# Patient Record
Sex: MALE | Race: WHITE | NOT HISPANIC OR LATINO | URBAN - METROPOLITAN AREA
[De-identification: names, ages, dates, MRNs, and addresses within clinical notes are randomized per-mention and may not be internally consistent; named-entity substitution may affect disease eponyms.]

---

## 2018-06-20 ENCOUNTER — EMERGENCY (EMERGENCY)
Facility: HOSPITAL | Age: 78
LOS: 0 days | Discharge: HOME | End: 2018-06-21
Attending: EMERGENCY MEDICINE
Payer: MEDICARE

## 2018-06-20 VITALS
RESPIRATION RATE: 20 BRPM | HEART RATE: 65 BPM | OXYGEN SATURATION: 96 % | TEMPERATURE: 96 F | DIASTOLIC BLOOD PRESSURE: 60 MMHG | SYSTOLIC BLOOD PRESSURE: 120 MMHG

## 2018-06-20 DIAGNOSIS — H53.129 TRANSIENT VISUAL LOSS, UNSPECIFIED EYE: ICD-10-CM

## 2018-06-20 DIAGNOSIS — G45.9 TRANSIENT CEREBRAL ISCHEMIC ATTACK, UNSPECIFIED: ICD-10-CM

## 2018-06-20 DIAGNOSIS — E78.5 HYPERLIPIDEMIA, UNSPECIFIED: ICD-10-CM

## 2018-06-20 DIAGNOSIS — E11.9 TYPE 2 DIABETES MELLITUS WITHOUT COMPLICATIONS: ICD-10-CM

## 2018-06-20 DIAGNOSIS — I10 ESSENTIAL (PRIMARY) HYPERTENSION: ICD-10-CM

## 2018-06-20 LAB
ALBUMIN SERPL ELPH-MCNC: 4.5 G/DL — SIGNIFICANT CHANGE UP (ref 3.5–5.2)
ALP SERPL-CCNC: 41 U/L — SIGNIFICANT CHANGE UP (ref 30–115)
ALT FLD-CCNC: 18 U/L — SIGNIFICANT CHANGE UP (ref 0–41)
ANION GAP SERPL CALC-SCNC: 13 MMOL/L — SIGNIFICANT CHANGE UP (ref 7–14)
AST SERPL-CCNC: 19 U/L — SIGNIFICANT CHANGE UP (ref 0–41)
BASOPHILS # BLD AUTO: 0.03 K/UL — SIGNIFICANT CHANGE UP (ref 0–0.2)
BASOPHILS NFR BLD AUTO: 0.4 % — SIGNIFICANT CHANGE UP (ref 0–1)
BILIRUB SERPL-MCNC: 0.3 MG/DL — SIGNIFICANT CHANGE UP (ref 0.2–1.2)
BUN SERPL-MCNC: 21 MG/DL — HIGH (ref 10–20)
CALCIUM SERPL-MCNC: 9.2 MG/DL — SIGNIFICANT CHANGE UP (ref 8.5–10.1)
CHLORIDE SERPL-SCNC: 101 MMOL/L — SIGNIFICANT CHANGE UP (ref 98–110)
CHOLEST SERPL-MCNC: 151 MG/DL — SIGNIFICANT CHANGE UP (ref 100–200)
CK MB CFR SERPL CALC: 3.3 NG/ML — SIGNIFICANT CHANGE UP (ref 0.6–6.3)
CK SERPL-CCNC: 97 U/L — SIGNIFICANT CHANGE UP (ref 0–225)
CO2 SERPL-SCNC: 27 MMOL/L — SIGNIFICANT CHANGE UP (ref 17–32)
CREAT SERPL-MCNC: 1.2 MG/DL — SIGNIFICANT CHANGE UP (ref 0.7–1.5)
EOSINOPHIL # BLD AUTO: 0.11 K/UL — SIGNIFICANT CHANGE UP (ref 0–0.7)
EOSINOPHIL NFR BLD AUTO: 1.4 % — SIGNIFICANT CHANGE UP (ref 0–8)
GLUCOSE SERPL-MCNC: 79 MG/DL — SIGNIFICANT CHANGE UP (ref 70–99)
HCT VFR BLD CALC: 42.4 % — SIGNIFICANT CHANGE UP (ref 42–52)
HDLC SERPL-MCNC: 43 MG/DL — SIGNIFICANT CHANGE UP (ref 40–125)
HGB BLD-MCNC: 13.4 G/DL — LOW (ref 14–18)
IMM GRANULOCYTES NFR BLD AUTO: 0.3 % — SIGNIFICANT CHANGE UP (ref 0.1–0.3)
LIPID PNL WITH DIRECT LDL SERPL: 85 MG/DL — SIGNIFICANT CHANGE UP (ref 4–129)
LYMPHOCYTES # BLD AUTO: 1.66 K/UL — SIGNIFICANT CHANGE UP (ref 1.2–3.4)
LYMPHOCYTES # BLD AUTO: 21.1 % — SIGNIFICANT CHANGE UP (ref 20.5–51.1)
MCHC RBC-ENTMCNC: 25.6 PG — LOW (ref 27–31)
MCHC RBC-ENTMCNC: 31.6 G/DL — LOW (ref 32–37)
MCV RBC AUTO: 80.9 FL — SIGNIFICANT CHANGE UP (ref 80–94)
MONOCYTES # BLD AUTO: 0.59 K/UL — SIGNIFICANT CHANGE UP (ref 0.1–0.6)
MONOCYTES NFR BLD AUTO: 7.5 % — SIGNIFICANT CHANGE UP (ref 1.7–9.3)
NEUTROPHILS # BLD AUTO: 5.44 K/UL — SIGNIFICANT CHANGE UP (ref 1.4–6.5)
NEUTROPHILS NFR BLD AUTO: 69.3 % — SIGNIFICANT CHANGE UP (ref 42.2–75.2)
NRBC # BLD: 0 /100 WBCS — SIGNIFICANT CHANGE UP (ref 0–0)
PLATELET # BLD AUTO: 202 K/UL — SIGNIFICANT CHANGE UP (ref 130–400)
POTASSIUM SERPL-MCNC: 4 MMOL/L — SIGNIFICANT CHANGE UP (ref 3.5–5)
POTASSIUM SERPL-SCNC: 4 MMOL/L — SIGNIFICANT CHANGE UP (ref 3.5–5)
PROT SERPL-MCNC: 7 G/DL — SIGNIFICANT CHANGE UP (ref 6–8)
RBC # BLD: 5.24 M/UL — SIGNIFICANT CHANGE UP (ref 4.7–6.1)
RBC # FLD: 16.3 % — HIGH (ref 11.5–14.5)
SODIUM SERPL-SCNC: 141 MMOL/L — SIGNIFICANT CHANGE UP (ref 135–146)
TOTAL CHOLESTEROL/HDL RATIO MEASUREMENT: 3.5 RATIO — LOW (ref 4–5.5)
TRIGL SERPL-MCNC: 161 MG/DL — HIGH (ref 10–149)
TROPONIN T SERPL-MCNC: <0.01 NG/ML — SIGNIFICANT CHANGE UP
WBC # BLD: 7.85 K/UL — SIGNIFICANT CHANGE UP (ref 4.8–10.8)
WBC # FLD AUTO: 7.85 K/UL — SIGNIFICANT CHANGE UP (ref 4.8–10.8)

## 2018-06-20 PROCEDURE — 93880 EXTRACRANIAL BILAT STUDY: CPT | Mod: 26

## 2018-06-20 NOTE — ED CDU PROVIDER INITIAL DAY NOTE - OBJECTIVE STATEMENT
79 y/o male with PMHX of HTN, HLD, DM, Diabetic Neuropathy, BPH, Left Eye artifical lens. presenting under TIA protocol. As per pt, this morning around 9am, pt states he went to go read the newspaper and started having blurry vision in his right eye. Pt states he went to clean his glasses and his eyes but blurry vision continued. States his vision went completely blind in the right eye lasting approx 5- 6 mins, then vision returned. Pt states he only saw light, while he was blind. Denies slurred speech, facial drooping, weakness, chest pain, syncope, dizziness/lightheadedness, numbness/tingling, Denies previous history of similar symptoms

## 2018-06-20 NOTE — ED PROVIDER NOTE - OBJECTIVE STATEMENT
77 y/o M, PMHx DM, BPH, Dyslipidemia, Neuropathy and s/p right lens implant, presents to the ED with complaints of transient vision loss to his right eye this AM at approximately 0930. He states that symptoms lasted less than a minute and have since resolved. He went to his opthalmologist Dr. Norris who did not believe etiology to be opthalmologic in nature and advised that he come to the ED for further work-up. He denies associated cephalgia, nausea, vomiting, fever, chills, neck pain, chest pain, and dyspnea.

## 2018-06-20 NOTE — ED CDU PROVIDER INITIAL DAY NOTE - ATTENDING CONTRIBUTION TO CARE
Pt has a history of HTN, DM, elevated cholesterol and presents with right eye vision loss lasting 6 minutes while reading a news paper. Pt notes he was able to see light. 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 No other complaints.     PE: neuro intact. S1S2 rrr, lungs clear, abdomen is soft nontender, normal speech.     IMP: TIA  rec: ehco, carotid duplex, MRI , neuro, cardiac monitoring

## 2018-06-20 NOTE — ED PROVIDER NOTE - PROGRESS NOTE DETAILS
case dw Dr. Norris who saw patient earlier today, normal ophtho exam, sent for neuro workup. will continue to assess. Per preliminary vascular report: carotid artery stenosis 20-39% b/l Spoke with neurologist Dr. Leonard ; we may place in observation unit for MRI/MRA ; spoke with observation unit GIAN Meraz. Patient and family amenable to plan.

## 2018-06-20 NOTE — ED PROVIDER NOTE - ATTENDING CONTRIBUTION TO CARE
78y m hx HLD, DM, afib (on metoprolol), L eye blindness, R eye artificial lens presents w eye vision change. This AM, patient had episode of blurry vision followed by flashing lights to R eye, lasted 5-6 minutes and now resolved. Associated w mild HA. No hx head trauma. No neck pain. No n/v. No fever or chills. No CP, SOB. No dizziness or syncope. Patient presented to ophtho Dr. Norris who sent patient to ED for evaluation. Patient has not had similar in the past. Exam: WDWN NAD comfortable appearing and conversing appropriately. NCAT neck FROM no ttp and no meningismus. Skin warm and dry. HEENT + L eye complete opacification, R eye w dilated pupil, minimally reactive. EOMI. VA 20/70 OD. MMM. S1S2 RRR, equal pulses b/l, lungs CTAB, no w/r/r, abdomen soft NTND, no r/g, no CVAT, no suprapubic ttp. No LE edema. A&O, normal strength and sensation, a&Ox3, other than above CN ii-xii intact, no dysmetria, no pronator drift, 5/5 strength UE and LE b/l, normal sensation, no aphasia, no dysarthria, no droop, normal gait. A/P - labs, imaging, ophtho/neuro consults as needed, likely admission.

## 2018-06-20 NOTE — ED PROVIDER NOTE - MEDICAL DECISION MAKING DETAILS
patient feeling well, no continued sx. dw dr. Leonard who will assess patient. ALIYAH for neuroimaging. case dw patient and family who are agreeable to plan.

## 2018-06-20 NOTE — ED ADULT NURSE NOTE - PMH
Diabetes    High cholesterol    Hypertension Diabetes    High cholesterol    Hypertension    Neuropathy

## 2018-06-21 VITALS
HEART RATE: 60 BPM | RESPIRATION RATE: 20 BRPM | SYSTOLIC BLOOD PRESSURE: 143 MMHG | OXYGEN SATURATION: 98 % | TEMPERATURE: 98 F | DIASTOLIC BLOOD PRESSURE: 62 MMHG

## 2018-06-21 RX ORDER — CLOPIDOGREL BISULFATE 75 MG/1
1 TABLET, FILM COATED ORAL
Qty: 14 | Refills: 0 | OUTPATIENT
Start: 2018-06-21 | End: 2018-07-04

## 2018-06-21 NOTE — CONSULT NOTE ADULT - ASSESSMENT
This is a 78y Male with h/o DM (controlled), HLD, HTN, and atrial fibrillation, presents complaining of transient unilateral vision loss.    Plan:   -Holter-monitoring outpatient with cardiologist, 30 days recommended to evaluate for AFib  -Discharge on Plavix and ASA  -Consider evaluating outpatient for possible right sensorineural hearing loss      06-21-18 @ 11:59 This is a 78y Male with h/o DM (controlled), HLD, HTN, and atrial fibrillation, presents complaining of transient unilateral vision loss s/o amaurosis fugax with underlying vascular risk factors.  neurologic w/u at this time negative.  recommend event monitor as outpt to clarify question of paroxysmal AFib.  in meantime continue secondary stroke prevention.    Plan:   -Holter-monitoring outpatient with cardiologist, 30 days recommended to evaluate for AFib  -Discharge on Plavix 75 and continue simastatin  - f/u as outpt in 2 - 4 wks      06-21-18 @ 11:59

## 2018-06-21 NOTE — CONSULT NOTE ADULT - SUBJECTIVE AND OBJECTIVE BOX
Neurology Consult    Patient is a 78y old  Male who presents with a chief complaint of transient unilateral vision loss    HPI:  Patient is an 77 yo  male with PMH of DM (controlled), HLD, HTN, and atrial fibrillation, presents complaining of transient unilateral vision loss. Yesterday morning he had a loss of vision in his right eye for about 6 minutes, described it as darkness, never had an episode like this before, vision gradually came back 10 minutes after. He had a mild headache afterwards.  He had atrial fibrillation about 5 years ago while taking pseudoephedrine, he was then placed on ASA 81 mg and Plavix, however 2 years ago he discontinued taking Plavix because someone told him not to take it.  He denied slurred speech, weakness, numbness or tingling, tearing of the eye, nausea or vomiting, chills or fever.  No history of stroke, MI, migraines, or seizures.      PAST MEDICAL & SURGICAL HISTORY:  Atrial Fibrillation  Neuropathy  High cholesterol  Hypertension  Diabetes  Essential tremor      FAMILY HISTORY:  Unknown, adopted    Social History: (-) x 3    Allergies    No Known Allergies    Intolerances        MEDICATIONS  (STANDING):    MEDICATIONS  (PRN):      Review of systems:    Constitutional: No fever, weight loss or fatigue    Eyes: No eye pain or discharge  ENMT:  Harper test lateralized to the left. Rinne positive bilaterally.  Neck: No pain or stiffness  Respiratory: No cough, wheezing, chills or hemoptysis  Cardiovascular: No chest pain, palpitations, shortness of breath, dyspnea on exertion  Gastrointestinal: No abdominal pain, nausea, vomiting or hematemesis; No diarrhea or constipation.   Genitourinary: No dysuria, frequency, hematuria or incontinence  Neurological: As per HPI  Skin: No rashes or lesions   Endocrine: No heat or cold intolerance; No hair loss  Musculoskeletal: No joint pain or swelling  Psychiatric: No depression, anxiety, mood swings  Heme/Lymph: No easy bruising or bleeding gums    Vital Signs Last 24 Hrs  T(C): 36.8 (21 Jun 2018 07:33), Max: 36.8 (21 Jun 2018 07:33)  T(F): 98.2 (21 Jun 2018 07:33), Max: 98.2 (21 Jun 2018 07:33)  HR: 60 (21 Jun 2018 07:33) (55 - 65)  BP: 143/62 (21 Jun 2018 07:33) (116/55 - 143/62)  BP(mean): --  RR: 20 (21 Jun 2018 07:33) (20 - 20)  SpO2: 98% (21 Jun 2018 07:33) (96% - 98%)    Neurologic Examination:  General:  Appearance is consistent with chronologic age.  No abnormal facies.   General: The patient is oriented to person, place, time and date.  Recent and remote memory intact.  Fund of knowledge is intact and normal.  Language with normal repetition, comprehension and naming.  Nondysarthric.    Cranial nerves: Left eye is nonfunctional after an accident in the war many years ago. Right eye: EOMI w/o nystagmus, skew or reported double vision.  Pupil is round and reactive to light.  No ptosis/weakness of eyelid closure. Sensory symmetric and intact V1-V3.  No facial asymmetry.  Harper lateralized to the left. Rinne positive bilaterally.  Palate elevates midline.  Tongue midline.  Motor examination:   Normal tone, bulk and range of motion.  No tenderness, twitching, or involuntary movements. Essential tremor present.  Formal Muscle Strength Testing: (MRC grade R/L) 5/5 UE; 5/5 LE.  No observable drift.  Reflexes:   2+ b/l biceps, brachioradialis, 3+b/l patella.   Sensory examination:   Intact to light touch and temperature in all extremities.  Cerebellum:   FTN/HKS intact.  No dysmetria or dysdiadokinesia.  Gait narrow based and normal. Tandem gait intact.    Labs:   CBC Full  -  ( 20 Jun 2018 14:04 )  WBC Count : 7.85 K/uL  Hemoglobin : 13.4 g/dL  Hematocrit : 42.4 %  Platelet Count - Automated : 202 K/uL  Mean Cell Volume : 80.9 fL  Mean Cell Hemoglobin : 25.6 pg  Mean Cell Hemoglobin Concentration : 31.6 g/dL  Auto Neutrophil # : 5.44 K/uL  Auto Lymphocyte # : 1.66 K/uL  Auto Monocyte # : 0.59 K/uL  Auto Eosinophil # : 0.11 K/uL  Auto Basophil # : 0.03 K/uL  Auto Neutrophil % : 69.3 %  Auto Lymphocyte % : 21.1 %  Auto Monocyte % : 7.5 %  Auto Eosinophil % : 1.4 %  Auto Basophil % : 0.4 %    06-20    141  |  101  |  21<H>  ----------------------------<  79  4.0   |  27  |  1.2    Ca    9.2      20 Jun 2018 14:04    TPro  7.0  /  Alb  4.5  /  TBili  0.3  /  DBili  x   /  AST  19  /  ALT  18  /  AlkPhos  41  06-20    LIVER FUNCTIONS - ( 20 Jun 2018 14:04 )  Alb: 4.5 g/dL / Pro: 7.0 g/dL / ALK PHOS: 41 U/L / ALT: 18 U/L / AST: 19 U/L / GGT: x                   Neuroimaging:  NCHCT: CT Head No Cont:   EXAM:  CT BRAIN            PROCEDURE DATE:  06/20/2018            INTERPRETATION:  Clinical History / Reason for exam: TIA.    Technique: Multiple contiguous axial CT images of the head were obtained   from the base of the skull to the vertex without administration of   intravenous contrast.    Comparison: None available at this time.       Findings: The ventricles, basal cisterns and sulcal pattern are slightly   prominent consistent with parenchymal volume loss. There are scattered   punctatefoci of hypodensities in the bilateral frontal and parietal   periventricular and subcortical white matter which are nonspecific and   without mass effect most likely consistent with chronic small vessel   ischemic changes in a patient of this statedage. There is no acute mass   effect, midline shift or hemorrhage. No extra-axial fluid collections are   identified.    The bones of the calvarium are intact. The left globe is slightly   shrunken and demonstrates calcifications may be consistent with previous   trauma and/or phthisis bulbi The paranasal sinuses, bilateral mastoid   complexes and right globe and orbit are grossly unremarkable.    Beam hardening artifact is noted overlying the brain stem and posterior   fossa which is inherent toCT in this location.      IMPRESSION:     1.  Periventricular and subcortical white matter chronic small vessel   ischemic changes.    2.  No acute mass effect, midline shift or hemorrhage.      3.  Left-sided phthisis bulbi.    4.  If the patient continues to be symptomatic follow-up MRI of the brain   may be helpful for further evaluation.                  KASSANDRA JOSE M.D., ATTENDING RADIOLOGIST  This document has been electronically signed. Jun 20 2018  3:10PM             (06-20-18 @ 14:58)    CT Angiography/Perfusion:  MRI Brain NC:  MRA Head/Neck:  EEG: Neurology Consult    Patient is a 78y old  Male who presents with a chief complaint of transient unilateral vision loss    HPI:  Patient is an 77 yo  male with PMH of DM (controlled), HLD, HTN, and atrial fibrillation, presents complaining of transient unilateral vision loss. Yesterday morning he had a loss of vision in his right eye for about 6 minutes, described it as darkness, never had an episode like this before, vision gradually came back 10 minutes after. He had a mild headache afterwards.  Had some visual scintillations after episode.  Saw Ophthalmology as outpt with no significant findings other than decreased visual acuity sent to hospital for workup.  He had atrial fibrillation about 5 years ago while taking pseudoephedrine, he was then placed on ASA 81 mg and Plavix, however 2 years ago he discontinued taking Plavix because someone told him not to take it.  He denied slurred speech, weakness, numbness or tingling, tearing of the eye, nausea or vomiting, chills or fever.  has chronic OS blindness.  No history of stroke, MI, migraines, or seizures.  currently back to baseline.     PAST MEDICAL & SURGICAL HISTORY:  Atrial Fibrillation remote- off medications for unclear reasons  Neuropathy  High cholesterol  Hypertension  Diabetes  Essential tremor    FAMILY HISTORY:  Unknown, adopted    Social History: (-) x 3    Allergies    No Known Allergies    Intolerances    MEDICATIONS  (STANDING):    MEDICATIONS  (PRN):    Review of systems:    Constitutional: No fever, weight loss or fatigue    Eyes: No eye pain or discharge  ENMT:  Harper test lateralized to the left. Rinne positive bilaterally.  Neck: No pain or stiffness  Respiratory: No cough, wheezing, chills or hemoptysis  Cardiovascular: No chest pain, palpitations, shortness of breath, dyspnea on exertion  Gastrointestinal: No abdominal pain, nausea, vomiting or hematemesis; No diarrhea or constipation.   Genitourinary: No dysuria, frequency, hematuria or incontinence  Neurological: As per HPI  Skin: No rashes or lesions   Endocrine: No heat or cold intolerance; No hair loss  Musculoskeletal: No joint pain or swelling  Psychiatric: No depression, anxiety, mood swings  Heme/Lymph: No easy bruising or bleeding gums    Vital Signs Last 24 Hrs  T(C): 36.8 (21 Jun 2018 07:33), Max: 36.8 (21 Jun 2018 07:33)  T(F): 98.2 (21 Jun 2018 07:33), Max: 98.2 (21 Jun 2018 07:33)  HR: 60 (21 Jun 2018 07:33) (55 - 65)  BP: 143/62 (21 Jun 2018 07:33) (116/55 - 143/62)  BP(mean): --  RR: 20 (21 Jun 2018 07:33) (20 - 20)  SpO2: 98% (21 Jun 2018 07:33) (96% - 98%)    Neurologic Examination:  General:  Appearance is consistent with chronologic age.  No abnormal facies.   General: The patient is oriented to person, place, time and date.  Recent and remote memory intact.  Fund of knowledge is intact and normal.  Language with normal repetition, comprehension and naming.  Nondysarthric.    Cranial nerves: Left eye is nonfunctional after an accident in the war many years ago. Right eye: EOMI w/o nystagmus, skew or reported double vision.  Pupil is round and reactive to light.  No ptosis/weakness of eyelid closure. Sensory symmetric and intact V1-V3.  No facial asymmetry.  Harper lateralized to the left. Rinne positive bilaterally.  Palate elevates midline.  Tongue midline.  Motor examination:   Normal tone, bulk and range of motion.  No tenderness, twitching, or involuntary movements. Essential tremor present.  Formal Muscle Strength Testing: (MRC grade R/L) 5/5 UE; 5/5 LE.  No observable drift.  Reflexes:   2+ b/l biceps, brachioradialis, 3+b/l patella.   Sensory examination:   Intact to light touch and temperature in all extremities.  Cerebellum:   FTN/HKS intact.  No dysmetria or dysdiadokinesia.  Gait narrow based and normal. Tandem gait intact.    Labs:   CBC Full  -  ( 20 Jun 2018 14:04 )  WBC Count : 7.85 K/uL  Hemoglobin : 13.4 g/dL  Hematocrit : 42.4 %  Platelet Count - Automated : 202 K/uL  Mean Cell Volume : 80.9 fL  Mean Cell Hemoglobin : 25.6 pg  Mean Cell Hemoglobin Concentration : 31.6 g/dL  Auto Neutrophil # : 5.44 K/uL  Auto Lymphocyte # : 1.66 K/uL  Auto Monocyte # : 0.59 K/uL  Auto Eosinophil # : 0.11 K/uL  Auto Basophil # : 0.03 K/uL  Auto Neutrophil % : 69.3 %  Auto Lymphocyte % : 21.1 %  Auto Monocyte % : 7.5 %  Auto Eosinophil % : 1.4 %  Auto Basophil % : 0.4 %    06-20    141  |  101  |  21<H>  ----------------------------<  79  4.0   |  27  |  1.2    Ca    9.2      20 Jun 2018 14:04    TPro  7.0  /  Alb  4.5  /  TBili  0.3  /  DBili  x   /  AST  19  /  ALT  18  /  AlkPhos  41  06-20    LIVER FUNCTIONS - ( 20 Jun 2018 14:04 )  Alb: 4.5 g/dL / Pro: 7.0 g/dL / ALK PHOS: 41 U/L / ALT: 18 U/L / AST: 19 U/L / GGT: x         CT: CT Head No Cont:   EXAM:  CT BRAIN        PROCEDURE DATE:  06/20/2018    INTERPRETATION:  Clinical History / Reason for exam: TIA.  Technique: Multiple contiguous axial CT images of the head were obtained   from the base of the skull to the vertex without administration of   intravenous contrast.  Comparison: None available at this time.   Findings: The ventricles, basal cisterns and sulcal pattern are slightly   prominent consistent with parenchymal volume loss. There are scattered   punctatefoci of hypodensities in the bilateral frontal and parietal   periventricular and subcortical white matter which are nonspecific and   without mass effect most likely consistent with chronic small vessel   ischemic changes in a patient of this statedage. There is no acute mass   effect, midline shift or hemorrhage. No extra-axial fluid collections are   identified.  The bones of the calvarium are intact. The left globe is slightly   shrunken and demonstrates calcifications may be consistent with previous   trauma and/or phthisis bulbi The paranasal sinuses, bilateral mastoid   complexes and right globe and orbit are grossly unremarkable.  Beam hardening artifact is noted overlying the brain stem and posterior   fossa which is inherent toCT in this location.  IMPRESSION:   1.  Periventricular and subcortical white matter chronic small vessel   ischemic changes.  2.  No acute mass effect, midline shift or hemorrhage.    3.  Left-sided phthisis bulbi.  4.  If the patient continues to be symptomatic follow-up MRI of the brain   may be helpful for further evaluation.  KASSANDRA JOSE M.D., ATTENDING RADIOLOGIST  This document has been electronically signed. Jun 20 2018  3:10PM      (06-20-18 @ 14:58)    CT Angiography/Perfusion:  MRI Brain NC:  MRA Head/Neck:  EEG:

## 2018-06-21 NOTE — ED ADULT NURSE REASSESSMENT NOTE - NS ED NURSE REASSESS COMMENT FT1
Pt assessed, pt alert and oriented. Pt on cardiac monitor. Pt went to MRI a few minutes ago via w/c. Ok to go off of monitor as per GIAN Hopkins.
Pt ok as per PA Compitello to go to TE via bed without monitor.
Patient alert and oriented. No acute distress. No c/o of pain or discomfort. Cardiac monitoring in progress. Will continue to monitor
Patient off unit at echocardiogram. will continue to monitor

## 2018-06-21 NOTE — ED CDU PROVIDER DISPOSITION NOTE - CLINICAL COURSE
[facility] 1[facility]24[facility]18.  Fax given to nurse.   Patient was sent to EDOU for further evaluation of transient unilaterla visual loss, Has remained in no distress and stable vitals without any meuro defecits, Ekg NSR no acute st-tchanges. 2- d echo normal, Carotid dupplex normal, MRI brain and MRA normal, Patient was seen by neurology Joya DONIS and catarino patient discharged on Plavix and will fallow patient in his office next week

## 2019-09-04 PROBLEM — E11.9 TYPE 2 DIABETES MELLITUS WITHOUT COMPLICATIONS: Chronic | Status: ACTIVE | Noted: 2018-06-20

## 2019-09-04 PROBLEM — G62.9 POLYNEUROPATHY, UNSPECIFIED: Chronic | Status: ACTIVE | Noted: 2018-06-20

## 2019-09-04 PROBLEM — I10 ESSENTIAL (PRIMARY) HYPERTENSION: Chronic | Status: ACTIVE | Noted: 2018-06-20

## 2019-09-04 PROBLEM — E78.00 PURE HYPERCHOLESTEROLEMIA, UNSPECIFIED: Chronic | Status: ACTIVE | Noted: 2018-06-20

## 2019-09-09 PROBLEM — Z00.00 ENCOUNTER FOR PREVENTIVE HEALTH EXAMINATION: Status: ACTIVE | Noted: 2019-09-09

## 2019-09-23 ENCOUNTER — APPOINTMENT (OUTPATIENT)
Dept: GERIATRICS | Facility: CLINIC | Age: 79
End: 2019-09-23
Payer: MEDICARE

## 2019-09-23 VITALS
TEMPERATURE: 97.9 F | RESPIRATION RATE: 16 BRPM | DIASTOLIC BLOOD PRESSURE: 50 MMHG | WEIGHT: 175 LBS | OXYGEN SATURATION: 95 % | BODY MASS INDEX: 29.88 KG/M2 | HEART RATE: 73 BPM | HEIGHT: 64 IN | SYSTOLIC BLOOD PRESSURE: 96 MMHG

## 2019-09-23 VITALS — SYSTOLIC BLOOD PRESSURE: 105 MMHG | DIASTOLIC BLOOD PRESSURE: 50 MMHG

## 2019-09-23 DIAGNOSIS — H18.9 UNSPECIFIED DISORDER OF CORNEA: ICD-10-CM

## 2019-09-23 DIAGNOSIS — H40.9 UNSPECIFIED GLAUCOMA: ICD-10-CM

## 2019-09-23 DIAGNOSIS — R52 PAIN, UNSPECIFIED: ICD-10-CM

## 2019-09-23 DIAGNOSIS — H11.001 UNSPECIFIED PTERYGIUM OF RIGHT EYE: ICD-10-CM

## 2019-09-23 DIAGNOSIS — Z85.820 PERSONAL HISTORY OF MALIGNANT MELANOMA OF SKIN: ICD-10-CM

## 2019-09-23 DIAGNOSIS — Z86.69 PERSONAL HISTORY OF OTHER DISEASES OF THE NERVOUS SYSTEM AND SENSE ORGANS: ICD-10-CM

## 2019-09-23 DIAGNOSIS — Z23 ENCOUNTER FOR IMMUNIZATION: ICD-10-CM

## 2019-09-23 PROCEDURE — 90662 IIV NO PRSV INCREASED AG IM: CPT

## 2019-09-23 PROCEDURE — G0008: CPT

## 2019-09-23 PROCEDURE — 99204 OFFICE O/P NEW MOD 45 MIN: CPT | Mod: 25

## 2019-09-23 NOTE — REVIEW OF SYSTEMS
[Feeling Tired] : feeling tired [Eyesight Problems] : eyesight problems [Joint Pain] : joint pain [As Noted in HPI] : as noted in HPI [Negative] : Endocrine [FreeTextEntry3] : recent surgery for right pterygium ? , left eye opacification baseline from [place] injury  [FreeTextEntry9] : shoulder pain  [de-identified] : hx of melanoma last year

## 2019-09-23 NOTE — HISTORY OF PRESENT ILLNESS
[0] : 1) Little interest or pleasure doing things: Not at all [FreeTextEntry1] : 79 year old male with history of diabetes, prostate disorder with TURP about 6-7 years ago, corneal opacification from injury in WW[place] with subsequent left vision loss presents for initial evaluation. \par \par PMH: TIA vs Amaurosis fugax? melanoma resected a year ago, diabetes, prostate disorder with TURP about 6-7 years ago, corneal opacification from injury in [place] with subsequent left vision loss, glacuoma \par PSH : left and right knee surgery (left >right) , right eye surgery for pterygium? last week , \par \par \par Patient has torn right shoulder not amendable to surgery due to age, per patient. Complaining of severe constant shoulder pain worst with movement. Has had injections before with no relief from pain. \par \par Has emotional and physical stress due to caring for wife. Recently hired help for wife. \par Not able to sleep through night, which is his baseline; but more tired than usual. \par Denies depression; but reports anxiety about wife's situation.\par No recent falls. \par \par Patient is unsure if he is taking duloextine 30mg; was going to ask psychologist if should take it. He has written down lyrica as his medication, but is unsure if he is on gabapentin vs lyrica, which he believes he takes for pain. \par \par Recently moved from [place] to [facility]. \par Retired about 10 years ago. Used to work in sweater industry. \par Never smoker. No alcohol use. \par \par HCM \par Colonoscopy less than 5 years ago \par Had 1 pneumonia vaccine in the past \par Was following with Podiatry in [place].

## 2019-09-23 NOTE — REASON FOR VISIT
[Initial Evaluation] : an initial evaluation [Family Member] : family member [FreeTextEntry1] : Patient presents to establish care. History of multiple medical problems including diabetes, neuropathy, TIA

## 2019-09-23 NOTE — ADDENDUM
[FreeTextEntry1] : Mr. Bowman who was seen with Dr. Whitfield, geriatric fellow. I obtained a detailed history and examined the patient. I discussed my find Mgs and plan with the patient, his son and Dr. Whitfield, reviewed the films noted agree with assessment and plan.\par Briefly this is an 79-year-old functionally independent gentleman presenting for followup. 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 The patient has a history of diabetes with neuropathy. He is status post a TIA? Amaurosis fugax about 2 years ago and maintained on Plavix since then. He has not experienced recurrence.\par The patient has a chronic right shoulder rotator cuff tear with pain and has not experienced relief with injections or physical therapy. On exam today the patient is well appearing. Systolic blood pressure is 105 heart tones are normal with an apical 2/6 systolic murmur. Remainder of exam is unremarkable.\par We will update patient's labs and immunizations. Continue current medications for now. Consider adding ACE inhibitor in the future if no other contraindication and BP allows. Follow up in 2 months per

## 2019-09-23 NOTE — PHYSICAL EXAM
[General Appearance - Alert] : alert [General Appearance - Well Nourished] : well nourished [General Appearance - In No Acute Distress] : in no acute distress [General Appearance - Well Developed] : well developed [General Appearance - Well-Appearing] : healthy appearing [Sclera] : the sclera and conjunctiva were normal [Normal Oral Mucosa] : normal oral mucosa [Outer Ear] : the ears and nose were normal in appearance [Hearing Threshold Finger Rub Not [place]] : hearing was normal [Oropharynx] : The oropharynx was normal [Neck Appearance] : the appearance of the neck was normal [Jugular Venous Distention Increased] : there was no jugular-venous distention [] : no respiratory distress [Auscultation Breath Sounds / Voice Sounds] : lungs were clear to auscultation bilaterally [Exaggerated Use Of Accessory Muscles For Inspiration] : no accessory muscle use [Respiration, Rhythm And Depth] : normal respiratory rhythm and effort [Heart Sounds] : normal S1 and S2 [Heart Rate And Rhythm] : heart rate was normal and rhythm regular [Edema] : there was no peripheral edema [Abdomen Soft] : soft [Abdomen Tenderness] : non-tender [Cervical Lymph Nodes Enlarged Posterior Bilaterally] : posterior cervical [No CVA Tenderness] : no ~M costovertebral angle tenderness [Cervical Lymph Nodes Enlarged Anterior Bilaterally] : anterior cervical [Nail Clubbing] : no clubbing  or cyanosis of the fingernails [No Spinal Tenderness] : no spinal tenderness [Musculoskeletal - Swelling] : no joint swelling seen [Motor Tone] : muscle strength and tone were normal [Motor Exam] : the motor exam was normal [Cranial Nerves] : cranial nerves 2-12 were intact [Total Score ___ / 30] : the patient achieved a score of [unfilled] /30 [No Focal Deficits] : no focal deficits [Place ___ / 5] : place [unfilled] / 5 [Date / Time ___ / 5] : date / time [unfilled] / 5 [Registration ___ / 3] : registration [unfilled] / 3 [Naming 2 Objects ___ / 2] : naming two objects [unfilled] / 2 [Serial Sevens ___/5] : serial sevens [unfilled] / 5 [Repeating a Sentence ___ / 1] : repeating a sentence [unfilled] / 1 [Writing a Sentence ___ / 1] : write sentence [unfilled] / 1 [3-stage Verbal Command ___ / 3] : three-stage verbal command [unfilled] / 3 [Copy a Design ___ / 1] : copy a design [unfilled] / 1 [Written Command ___ / 1] : written command [unfilled] / 1 [Recall ___ / 3] : recall [unfilled] / 3 [Mood] : the mood was normal [Impaired Insight] : insight and judgment were intact [Affect] : the affect was normal [FreeTextEntry1] : cherry angiomas, back torso withs urgical scar from melanoma excision last year

## 2019-09-23 NOTE — ASSESSMENT
[FreeTextEntry1] : 79 year old male with history of diabetes, prostate disorder with TURP about 6-7 years ago, corneal opacification from injury in [place] with subsequent left vision loss presents for initial evaluation. \par \par Advised patient to clarify which medications he is taking and bring bottles next time to review at clinic. \par Follow up labs. \par Will need second pneumonia vaccine at future visit.

## 2019-09-24 LAB
25(OH)D3 SERPL-MCNC: 36.1 NG/ML
ALBUMIN SERPL ELPH-MCNC: 4.2 G/DL
ALP BLD-CCNC: 37 U/L
ALT SERPL-CCNC: 13 U/L
ANION GAP SERPL CALC-SCNC: 12 MMOL/L
AST SERPL-CCNC: 16 U/L
BASOPHILS # BLD AUTO: 0.05 K/UL
BASOPHILS NFR BLD AUTO: 0.6 %
BILIRUB SERPL-MCNC: 0.2 MG/DL
BUN SERPL-MCNC: 18 MG/DL
CALCIUM SERPL-MCNC: 9.9 MG/DL
CHLORIDE SERPL-SCNC: 104 MMOL/L
CHOLEST SERPL-MCNC: 119 MG/DL
CHOLEST/HDLC SERPL: 3.5 RATIO
CO2 SERPL-SCNC: 27 MMOL/L
CREAT SERPL-MCNC: 1.28 MG/DL
EOSINOPHIL # BLD AUTO: 0.14 K/UL
EOSINOPHIL NFR BLD AUTO: 1.7 %
ESTIMATED AVERAGE GLUCOSE: 154 MG/DL
FOLATE SERPL-MCNC: 8.1 NG/ML
GLUCOSE SERPL-MCNC: 126 MG/DL
HBA1C MFR BLD HPLC: 7 %
HCT VFR BLD CALC: 40.4 %
HDLC SERPL-MCNC: 34 MG/DL
HGB BLD-MCNC: 12.6 G/DL
IMM GRANULOCYTES NFR BLD AUTO: 0.4 %
LDLC SERPL CALC-MCNC: 44 MG/DL
LYMPHOCYTES # BLD AUTO: 1.71 K/UL
LYMPHOCYTES NFR BLD AUTO: 20.6 %
MAN DIFF?: NORMAL
MCHC RBC-ENTMCNC: 26.4 PG
MCHC RBC-ENTMCNC: 31.2 GM/DL
MCV RBC AUTO: 84.7 FL
MONOCYTES # BLD AUTO: 0.8 K/UL
MONOCYTES NFR BLD AUTO: 9.6 %
NEUTROPHILS # BLD AUTO: 5.57 K/UL
NEUTROPHILS NFR BLD AUTO: 67.1 %
PLATELET # BLD AUTO: 210 K/UL
POTASSIUM SERPL-SCNC: 4.2 MMOL/L
PROT SERPL-MCNC: 6.2 G/DL
PSA SERPL-MCNC: 3.39 NG/ML
RBC # BLD: 4.77 M/UL
RBC # FLD: 15.9 %
SODIUM SERPL-SCNC: 143 MMOL/L
TRIGL SERPL-MCNC: 206 MG/DL
TSH SERPL-ACNC: 0.4 UIU/ML
VIT B12 SERPL-MCNC: 194 PG/ML
WBC # FLD AUTO: 8.3 K/UL

## 2019-09-24 RX ORDER — PREGABALIN 300 MG/1
300 CAPSULE ORAL AT BEDTIME
Refills: 0 | Status: DISCONTINUED | COMMUNITY
Start: 2019-09-23 | End: 2019-09-24

## 2019-09-24 RX ORDER — CHLORHEXIDINE GLUCONATE 4 %
1000 LIQUID (ML) TOPICAL DAILY
Qty: 100 | Refills: 2 | Status: ACTIVE | COMMUNITY
Start: 2019-09-24 | End: 1900-01-01

## 2019-09-24 RX ORDER — PREGABALIN 100 MG/1
100 CAPSULE ORAL DAILY
Refills: 0 | Status: DISCONTINUED | COMMUNITY
Start: 2019-09-23 | End: 2019-09-24

## 2019-10-27 NOTE — ED ADULT NURSE NOTE - NS PRO PASSIVE SMOKE EXP
No
Implemented All Fall Risk Interventions:  [place] to call system. Call bell, personal items and telephone within reach. Instruct patient to call for assistance. Room bathroom lighting operational. Non-slip footwear when patient is off stretcher. Physically safe environment: no spills, clutter or unnecessary equipment. Stretcher in lowest position, wheels locked, appropriate side rails in place. Provide visual cue, wrist band, yellow gown, etc. Monitor gait and stability. Monitor for mental status changes and reorient to person, place, and time. Review medications for side effects contributing to fall risk. Reinforce activity limits and safety measures with patient and family.

## 2019-10-30 ENCOUNTER — APPOINTMENT (OUTPATIENT)
Dept: GERIATRICS | Facility: CLINIC | Age: 79
End: 2019-10-30
Payer: MEDICARE

## 2019-10-30 VITALS
RESPIRATION RATE: 15 BRPM | BODY MASS INDEX: 29.77 KG/M2 | HEIGHT: 64 IN | DIASTOLIC BLOOD PRESSURE: 60 MMHG | TEMPERATURE: 97.5 F | HEART RATE: 77 BPM | WEIGHT: 174.38 LBS | SYSTOLIC BLOOD PRESSURE: 100 MMHG | OXYGEN SATURATION: 97 %

## 2019-10-30 DIAGNOSIS — F41.9 ANXIETY DISORDER, UNSPECIFIED: ICD-10-CM

## 2019-10-30 DIAGNOSIS — F32.9 ANXIETY DISORDER, UNSPECIFIED: ICD-10-CM

## 2019-10-30 PROCEDURE — 99214 OFFICE O/P EST MOD 30 MIN: CPT

## 2019-10-30 NOTE — HISTORY OF PRESENT ILLNESS
[FreeTextEntry1] : Mr. Edward Molina is an 79-year-old gentleman presenting for followup. Overall he feels well. He offers no new somatic complaints. He had an injection in his right shoulder that did not help much. He feels stressed by his wife's medical problems. He denies recent difficulty walking, falls, chest pain, shortness of breath.

## 2019-10-30 NOTE — PHYSICAL EXAM
[General Appearance - Alert] : alert [General Appearance - In No Acute Distress] : in no acute distress [General Appearance - Well Nourished] : well nourished [General Appearance - Well Developed] : well developed [Normal Oral Mucosa] : normal oral mucosa [Outer Ear] : the ears and nose were normal in appearance [Neck Cervical Mass (___cm)] : no neck mass was observed [Jugular Venous Distention Increased] : there was no jugular-venous distention [Auscultation Breath Sounds / Voice Sounds] : lungs were clear to auscultation bilaterally [Heart Sounds] : normal S1 and S2 [Edema] : there was no peripheral edema [Abdomen Soft] : soft [Abdomen Tenderness] : non-tender [Cervical Lymph Nodes Enlarged Posterior Bilaterally] : posterior cervical [Cervical Lymph Nodes Enlarged Anterior Bilaterally] : anterior cervical [Supraclavicular Lymph Nodes Enlarged Bilaterally] : supraclavicular [Axillary Lymph Nodes Enlarged Bilaterally] : axillary [No CVA Tenderness] : no ~M costovertebral angle tenderness [No Spinal Tenderness] : no spinal tenderness [Abnormal Walk] : normal gait [Involuntary Movements] : no involuntary movements were seen [Motor Tone] : muscle strength and tone were normal [] : no rash [No Focal Deficits] : no focal deficits

## 2019-11-13 ENCOUNTER — MEDICATION RENEWAL (OUTPATIENT)
Age: 79
End: 2019-11-13

## 2019-11-14 ENCOUNTER — MEDICATION RENEWAL (OUTPATIENT)
Age: 79
End: 2019-11-14

## 2019-11-14 RX ORDER — GABAPENTIN 300 MG/1
300 CAPSULE ORAL
Qty: 90 | Refills: 1 | Status: ACTIVE | COMMUNITY
Start: 2019-09-24 | End: 1900-01-01

## 2019-11-14 RX ORDER — GABAPENTIN 100 MG/1
100 CAPSULE ORAL
Qty: 90 | Refills: 1 | Status: ACTIVE | COMMUNITY
Start: 2019-09-24 | End: 1900-01-01

## 2019-11-18 ENCOUNTER — MEDICATION RENEWAL (OUTPATIENT)
Age: 79
End: 2019-11-18

## 2020-01-13 ENCOUNTER — APPOINTMENT (OUTPATIENT)
Dept: GERIATRICS | Facility: CLINIC | Age: 80
End: 2020-01-13

## 2020-02-03 ENCOUNTER — MED ADMIN CHARGE (OUTPATIENT)
Age: 80
End: 2020-02-03

## 2020-02-03 ENCOUNTER — APPOINTMENT (OUTPATIENT)
Dept: GERIATRICS | Facility: CLINIC | Age: 80
End: 2020-02-03
Payer: MEDICARE

## 2020-02-03 VITALS
RESPIRATION RATE: 14 BRPM | SYSTOLIC BLOOD PRESSURE: 110 MMHG | BODY MASS INDEX: 30.56 KG/M2 | OXYGEN SATURATION: 98 % | HEART RATE: 70 BPM | TEMPERATURE: 97.6 F | HEIGHT: 64 IN | WEIGHT: 179 LBS | DIASTOLIC BLOOD PRESSURE: 60 MMHG

## 2020-02-03 DIAGNOSIS — M75.81 OTHER SHOULDER LESIONS, RIGHT SHOULDER: ICD-10-CM

## 2020-02-03 DIAGNOSIS — M25.519 PAIN IN UNSPECIFIED SHOULDER: ICD-10-CM

## 2020-02-03 DIAGNOSIS — D64.9 ANEMIA, UNSPECIFIED: ICD-10-CM

## 2020-02-03 PROCEDURE — 96372 THER/PROPH/DIAG INJ SC/IM: CPT

## 2020-02-03 PROCEDURE — 99214 OFFICE O/P EST MOD 30 MIN: CPT

## 2020-02-03 RX ORDER — DICLOFENAC SODIUM 10 MG/G
1 GEL TOPICAL
Qty: 1 | Refills: 0 | Status: ACTIVE | COMMUNITY
Start: 2020-02-03

## 2020-02-03 RX ORDER — CYANOCOBALAMIN 1000 UG/ML
1000 INJECTION INTRAMUSCULAR; SUBCUTANEOUS
Qty: 0 | Refills: 0 | Status: COMPLETED | OUTPATIENT
Start: 2020-02-03

## 2020-02-03 RX ADMIN — CYANOCOBALAMIN 0 MCG/ML: 1000 INJECTION, SOLUTION INTRAMUSCULAR at 00:00

## 2020-02-03 NOTE — HISTORY OF PRESENT ILLNESS
[FreeTextEntry1] : Mr. Molina presents for acute followup. He has long-standing history of right shoulder pain. He is status post failed rotator cuff surgery in the past. He has had flares of right shoulder pain and treats it with topical OTCs as well as Voltaren gel. Injections have not helped in the past and he was advised against further surgery. He states the pain is severe at times some and travels down his arm. It is much worse when he sleeps on it he uses a special pillow at night.\par Patient feels otherwise well. He is not experiencing any new symptoms.Mood is improved because his wife is doing much better medically

## 2020-02-03 NOTE — PHYSICAL EXAM
[General Appearance - Alert] : alert [General Appearance - In No Acute Distress] : in no acute distress [General Appearance - Well Nourished] : well nourished [General Appearance - Well Developed] : well developed [Neck Cervical Mass (___cm)] : no neck mass was observed [Respiration, Rhythm And Depth] : normal respiratory rhythm and effort [Jugular Venous Distention Increased] : there was no jugular-venous distention [Auscultation Breath Sounds / Voice Sounds] : lungs were clear to auscultation bilaterally [Heart Sounds] : normal S1 and S2 [Edema] : there was no peripheral edema [Abdomen Tenderness] : non-tender [Cervical Lymph Nodes Enlarged Posterior Bilaterally] : posterior cervical [Axillary Lymph Nodes Enlarged Bilaterally] : axillary [Supraclavicular Lymph Nodes Enlarged Bilaterally] : supraclavicular [Cervical Lymph Nodes Enlarged Anterior Bilaterally] : anterior cervical [No Spinal Tenderness] : no spinal tenderness [No CVA Tenderness] : no ~M costovertebral angle tenderness [Abnormal Walk] : normal gait [Involuntary Movements] : no involuntary movements were seen [Musculoskeletal - Swelling] : no joint swelling seen [Motor Tone] : muscle strength and tone were normal [FreeTextEntry1] : Mild proximal deltoid tenderness. No joint effusion. Active right shoulder ROM slightly limited [] : no rash [No Focal Deficits] : no focal deficits

## 2020-06-08 ENCOUNTER — APPOINTMENT (OUTPATIENT)
Dept: GERIATRICS | Facility: CLINIC | Age: 80
End: 2020-06-08

## 2020-08-27 ENCOUNTER — APPOINTMENT (OUTPATIENT)
Dept: GERIATRICS | Facility: CLINIC | Age: 80
End: 2020-08-27
Payer: MEDICARE

## 2020-08-27 VITALS
SYSTOLIC BLOOD PRESSURE: 110 MMHG | BODY MASS INDEX: 30.22 KG/M2 | WEIGHT: 177 LBS | HEART RATE: 65 BPM | TEMPERATURE: 98.6 F | DIASTOLIC BLOOD PRESSURE: 70 MMHG | RESPIRATION RATE: 14 BRPM | OXYGEN SATURATION: 95 % | HEIGHT: 64 IN

## 2020-08-27 DIAGNOSIS — N40.0 BENIGN PROSTATIC HYPERPLASIA WITHOUT LOWER URINARY TRACT SYMPMS: ICD-10-CM

## 2020-08-27 PROCEDURE — 99214 OFFICE O/P EST MOD 30 MIN: CPT

## 2020-08-27 NOTE — HISTORY OF PRESENT ILLNESS
[FreeTextEntry1] : Mr. Molina is a 80-year-old hypertensive, diabetic gentleman presenting for followup. He returned from [place] about a week ago. He states that while in [place] he completed 2 part shingles vaccination (Shingrix) and received the flu shot 2 weeks ago. Of note he experienced an episode of mild lightheadedness and measured an unusually high blood pressure. The patient states he went to a walk-in clinic where his blood pressure was normal. He has not experienced a similar episode since. The patient states that prior to leaving [place] he tested both Covid PCR and IgG negative. The patient feels well. He denies any new complaints. No new medications.

## 2020-08-27 NOTE — PHYSICAL EXAM
[General Appearance - Alert] : alert [General Appearance - Well Nourished] : well nourished [General Appearance - Well-Appearing] : healthy appearing [General Appearance - In No Acute Distress] : in no acute distress [Outer Ear] : the ears and nose were normal in appearance [Neck Appearance] : the appearance of the neck was normal [Neck Cervical Mass (___cm)] : no neck mass was observed [Auscultation Breath Sounds / Voice Sounds] : lungs were clear to auscultation bilaterally [Heart Sounds] : normal S1 and S2 [Edema] : there was no peripheral edema [Bowel Sounds] : normal bowel sounds [Abdomen Soft] : soft [Abdomen Tenderness] : non-tender [FreeTextEntry1] : Obese [Supraclavicular Lymph Nodes Enlarged Bilaterally] : supraclavicular [Cervical Lymph Nodes Enlarged Posterior Bilaterally] : posterior cervical [Axillary Lymph Nodes Enlarged Bilaterally] : axillary [Cervical Lymph Nodes Enlarged Anterior Bilaterally] : anterior cervical [No Spinal Tenderness] : no spinal tenderness [No CVA Tenderness] : no ~M costovertebral angle tenderness [Involuntary Movements] : no involuntary movements were seen [No Focal Deficits] : no focal deficits [] : no rash

## 2020-08-31 LAB
ALBUMIN SERPL ELPH-MCNC: 4.7 G/DL
ALP BLD-CCNC: 33 U/L
ALT SERPL-CCNC: 15 U/L
ANION GAP SERPL CALC-SCNC: 17 MMOL/L
AST SERPL-CCNC: 16 U/L
BASOPHILS # BLD AUTO: 0.04 K/UL
BASOPHILS NFR BLD AUTO: 0.5 %
BILIRUB SERPL-MCNC: 0.2 MG/DL
BUN SERPL-MCNC: 23 MG/DL
CALCIUM SERPL-MCNC: 10.3 MG/DL
CHLORIDE SERPL-SCNC: 104 MMOL/L
CHOLEST SERPL-MCNC: 115 MG/DL
CHOLEST/HDLC SERPL: 3.1 RATIO
CO2 SERPL-SCNC: 23 MMOL/L
CREAT SERPL-MCNC: 1.51 MG/DL
CREAT SPEC-SCNC: 148 MG/DL
EOSINOPHIL # BLD AUTO: 0.15 K/UL
EOSINOPHIL NFR BLD AUTO: 1.7 %
ESTIMATED AVERAGE GLUCOSE: 137 MG/DL
FOLATE SERPL-MCNC: 11.1 NG/ML
GLUCOSE SERPL-MCNC: 92 MG/DL
HBA1C MFR BLD HPLC: 6.4 %
HCT VFR BLD CALC: 42.3 %
HDLC SERPL-MCNC: 38 MG/DL
HGB BLD-MCNC: 12.9 G/DL
IMM GRANULOCYTES NFR BLD AUTO: 0.2 %
LDLC SERPL CALC-MCNC: 48 MG/DL
LYMPHOCYTES # BLD AUTO: 1.99 K/UL
LYMPHOCYTES NFR BLD AUTO: 22.8 %
MAN DIFF?: NORMAL
MCHC RBC-ENTMCNC: 26.4 PG
MCHC RBC-ENTMCNC: 30.5 GM/DL
MCV RBC AUTO: 86.7 FL
MICROALBUMIN 24H UR DL<=1MG/L-MCNC: 1.9 MG/DL
MICROALBUMIN/CREAT 24H UR-RTO: 13 MG/G
MONOCYTES # BLD AUTO: 0.72 K/UL
MONOCYTES NFR BLD AUTO: 8.3 %
NEUTROPHILS # BLD AUTO: 5.79 K/UL
NEUTROPHILS NFR BLD AUTO: 66.5 %
PLATELET # BLD AUTO: 236 K/UL
POTASSIUM SERPL-SCNC: 4.4 MMOL/L
PROT SERPL-MCNC: 6.8 G/DL
PSA SERPL-MCNC: 5.57 NG/ML
RBC # BLD: 4.88 M/UL
RBC # FLD: 16.2 %
SODIUM SERPL-SCNC: 144 MMOL/L
TRIGL SERPL-MCNC: 146 MG/DL
VIT B12 SERPL-MCNC: 849 PG/ML
WBC # FLD AUTO: 8.71 K/UL

## 2020-09-01 ENCOUNTER — APPOINTMENT (OUTPATIENT)
Dept: UROLOGY | Facility: CLINIC | Age: 80
End: 2020-09-01
Payer: MEDICARE

## 2020-09-01 VITALS
SYSTOLIC BLOOD PRESSURE: 130 MMHG | HEART RATE: 57 BPM | HEIGHT: 64 IN | WEIGHT: 172 LBS | DIASTOLIC BLOOD PRESSURE: 68 MMHG | BODY MASS INDEX: 29.37 KG/M2

## 2020-09-01 VITALS — TEMPERATURE: 98.2 F

## 2020-09-01 DIAGNOSIS — R35.0 FREQUENCY OF MICTURITION: ICD-10-CM

## 2020-09-01 DIAGNOSIS — R97.20 ELEVATED PROSTATE, SPECIFIC ANTIGEN [PSA]: ICD-10-CM

## 2020-09-01 PROCEDURE — 99204 OFFICE O/P NEW MOD 45 MIN: CPT

## 2020-09-01 NOTE — HISTORY OF PRESENT ILLNESS
[FreeTextEntry1] : 81yo gentleman with cc of elevated PSA and urinary frequency. Pt reports bothersome frequency both day and night. He is getting up 2-3 times per night. He is going about every 2-3h in the day. He denies any straining or feelings of incomplete emptying. He endorses occasional urgency. No urinary incontinence. He has had prostate procedure x2, most recent was in office about 5y ago, ?TUMT. Pt has been on tamsulosin for years BID. \par \par Pt drinks 3 cups of coffee per day and seltzer. Has hx of DM, a1c is 6.4. Has hx of MI and is on plavix. He had recent PSA that was 5.6. His prior value was 3.4 in Sept of 2019. No family hx of prostate ca. No LE edema. Has significant snoring and daytime tiredness. No eval for TERRY.

## 2020-09-01 NOTE — PHYSICAL EXAM
[General Appearance - Well Developed] : well developed [General Appearance - Well Nourished] : well nourished [General Appearance - In No Acute Distress] : no acute distress [Edema] : no peripheral edema [Exaggerated Use Of Accessory Muscles For Inspiration] : no accessory muscle use [Normal Station and Gait] : the gait and station were normal for the patient's age [Skin Color & Pigmentation] : normal skin color and pigmentation [No Focal Deficits] : no focal deficits [Oriented To Time, Place, And Person] : oriented to person, place, and time [Cervical Lymph Nodes Enlarged Anterior Bilaterally] : anterior cervical [Supraclavicular Lymph Nodes Enlarged Bilaterally] : supraclavicular [FreeTextEntry1] : 30cc, firm throughout without being hard and no clear nodules

## 2020-09-01 NOTE — REVIEW OF SYSTEMS
[Feeling Tired] : feeling tired [Eyesight Problems] : eyesight problems [Dry Eyes] : dryness of the eyes [Constipation] : constipation [Heartburn] : heartburn [Poor quality erections] : Poor quality erections [Wake up at night to urinate  How many times?  ___] : wakes up to urinate [unfilled] times during the night [Strong urge to urinate] : strong urge to urinate [Slow urine stream] : slow urine stream [Joint Pain] : joint pain [Negative] : Heme/Lymph

## 2020-09-01 NOTE — ASSESSMENT
[FreeTextEntry1] : Bothersome urinary sx with multiple contributing factors. Emptying bladder very well. \par --Change to decaf coffee\par --COnsider TERRY testing. Pt to discuss with PCP\par --Consider d/c flomax after above\par \par PSA value not overtly concerning based on pts age but is a rise from last year\par --Repeat PSA. If still elevated. consider MRI\par

## 2020-09-02 LAB
APPEARANCE: CLEAR
BACTERIA: NEGATIVE
BILIRUBIN URINE: NEGATIVE
BLOOD URINE: NEGATIVE
COLOR: NORMAL
GLUCOSE QUALITATIVE U: NEGATIVE
HYALINE CASTS: 0 /LPF
KETONES URINE: NEGATIVE
LEUKOCYTE ESTERASE URINE: NEGATIVE
MICROSCOPIC-UA: NORMAL
NITRITE URINE: NEGATIVE
PH URINE: 6.5
PROTEIN URINE: NORMAL
PSA FREE FLD-MCNC: 29 %
PSA FREE SERPL-MCNC: 1.48 NG/ML
PSA SERPL-MCNC: 5.03 NG/ML
RED BLOOD CELLS URINE: 0 /HPF
SPECIFIC GRAVITY URINE: 1.02
SQUAMOUS EPITHELIAL CELLS: 0 /HPF
UROBILINOGEN URINE: NORMAL
WHITE BLOOD CELLS URINE: 0 /HPF

## 2020-09-03 LAB — BACTERIA UR CULT: NORMAL

## 2020-11-17 DIAGNOSIS — M54.9 DORSALGIA, UNSPECIFIED: ICD-10-CM

## 2020-11-24 ENCOUNTER — RX RENEWAL (OUTPATIENT)
Age: 80
End: 2020-11-24

## 2020-11-24 RX ORDER — METFORMIN HYDROCHLORIDE 1000 MG/1
1000 TABLET, COATED ORAL TWICE DAILY
Qty: 180 | Refills: 0 | Status: ACTIVE | COMMUNITY
Start: 2019-09-23 | End: 1900-01-01

## 2020-12-04 RX ORDER — BLOOD-GLUCOSE METER
EACH MISCELLANEOUS
Qty: 1 | Refills: 0 | Status: ACTIVE | COMMUNITY
Start: 2020-12-01 | End: 1900-01-01

## 2020-12-16 RX ORDER — BLOOD SUGAR DIAGNOSTIC
STRIP MISCELLANEOUS DAILY
Qty: 100 | Refills: 0 | Status: ACTIVE | COMMUNITY
Start: 2020-12-16 | End: 1900-01-01

## 2020-12-21 ENCOUNTER — APPOINTMENT (OUTPATIENT)
Dept: GERIATRICS | Facility: CLINIC | Age: 80
End: 2020-12-21
Payer: MEDICARE

## 2020-12-21 VITALS
BODY MASS INDEX: 28.77 KG/M2 | OXYGEN SATURATION: 95 % | HEIGHT: 64 IN | TEMPERATURE: 98 F | DIASTOLIC BLOOD PRESSURE: 70 MMHG | HEART RATE: 73 BPM | RESPIRATION RATE: 14 BRPM | SYSTOLIC BLOOD PRESSURE: 120 MMHG | WEIGHT: 168.5 LBS

## 2020-12-21 DIAGNOSIS — E53.8 DEFICIENCY OF OTHER SPECIFIED B GROUP VITAMINS: ICD-10-CM

## 2020-12-21 DIAGNOSIS — R06.83 SNORING: ICD-10-CM

## 2020-12-21 DIAGNOSIS — R79.89 OTHER SPECIFIED ABNORMAL FINDINGS OF BLOOD CHEMISTRY: ICD-10-CM

## 2020-12-21 DIAGNOSIS — K59.09 OTHER CONSTIPATION: ICD-10-CM

## 2020-12-21 DIAGNOSIS — E78.5 HYPERLIPIDEMIA, UNSPECIFIED: ICD-10-CM

## 2020-12-21 PROCEDURE — 99215 OFFICE O/P EST HI 40 MIN: CPT

## 2020-12-21 RX ORDER — DORZOLAMIDE HYDROCHLORIDE TIMOLOL MALEATE 20; 5 MG/ML; MG/ML
22.3-6.8 SOLUTION/ DROPS OPHTHALMIC
Qty: 20 | Refills: 0 | Status: ACTIVE | COMMUNITY
Start: 2020-09-21

## 2020-12-21 RX ORDER — MELOXICAM 15 MG/1
15 TABLET ORAL
Qty: 50 | Refills: 0 | Status: COMPLETED | COMMUNITY
Start: 2020-07-06

## 2020-12-21 RX ORDER — MULTIVIT-MIN/FOLIC/VIT K/LYCOP 400-300MCG
50 MCG TABLET ORAL
Qty: 30 | Refills: 0 | Status: ACTIVE | COMMUNITY
Start: 2020-12-21

## 2020-12-21 RX ORDER — DICLOFENAC SODIUM 75 MG/1
75 TABLET, DELAYED RELEASE ORAL
Qty: 50 | Refills: 0 | Status: COMPLETED | COMMUNITY
Start: 2020-08-11

## 2020-12-21 RX ORDER — BIMATOPROST 0.1 MG/ML
0.01 SOLUTION/ DROPS OPHTHALMIC
Qty: 8 | Refills: 0 | Status: ACTIVE | COMMUNITY
Start: 2020-03-11

## 2020-12-21 RX ORDER — VIT C/E/ZN/COPPR/LUTEIN/ZEAXAN 250MG-90MG
CAPSULE ORAL
Qty: 60 | Refills: 5 | Status: ACTIVE | COMMUNITY
Start: 2020-12-21

## 2020-12-21 NOTE — PHYSICAL EXAM
[General Appearance - Alert] : alert [General Appearance - In No Acute Distress] : in no acute distress [General Appearance - Well-Appearing] : healthy appearing [Outer Ear] : the ears and nose were normal in appearance [Neck Appearance] : the appearance of the neck was normal [Neck Cervical Mass (___cm)] : no neck mass was observed [Jugular Venous Distention Increased] : there was no jugular-venous distention [Thyroid Diffuse Enlargement] : the thyroid was not enlarged [Thyroid Nodule] : there were no palpable thyroid nodules [Auscultation Breath Sounds / Voice Sounds] : lungs were clear to auscultation bilaterally [Heart Sounds] : normal S1 and S2 [Edema] : there was no peripheral edema [Bowel Sounds] : normal bowel sounds [Abdomen Soft] : soft [Abdomen Tenderness] : non-tender [Abdomen Mass (___ Cm)] : no abdominal mass palpated [FreeTextEntry1] : No suprapubic fullness [Cervical Lymph Nodes Enlarged Posterior Bilaterally] : posterior cervical [Cervical Lymph Nodes Enlarged Anterior Bilaterally] : anterior cervical [Supraclavicular Lymph Nodes Enlarged Bilaterally] : supraclavicular [Axillary Lymph Nodes Enlarged Bilaterally] : axillary [No CVA Tenderness] : no ~M costovertebral angle tenderness [No Spinal Tenderness] : no spinal tenderness [Involuntary Movements] : no involuntary movements were seen [] : no rash [No Focal Deficits] : no focal deficits

## 2020-12-21 NOTE — ASSESSMENT
[FreeTextEntry1] : Patient is planning on going to [place]  in about 3 weeks. Advised to establish relationship with local [email or web address]

## 2020-12-21 NOTE — HISTORY OF PRESENT ILLNESS
[FreeTextEntry1] : Mr. Molina is an 80-year-old diabetic gentleman presenting for followup. Recent events noted. The patient had abdominal CT revealing a cecal mass. He had colonoscopy x2 due to poor bowel prep. A successful colonoscopy was performed on November 19 revealing a rectal polyp but no cecal mass. Patient also seeing urology. Comprehensive labs reviewed revealing elevated creatinine and mildly depressed TSH.\par Patient complains of poor sleep. Complains of snoring. Complains of feeling drowsy and fatigued throughout the day.\par Patient also complains of generalized pain. Mostly shoulders, right worse then left, chronic low back pain,knee pain and most recently left hip pain for which he received an injection by Dr. Wan(?), which provided relief only briefly. He was given courses of NSAIDs over the summer which I have explicitly told the patient in the past to avoid given risks to his kidneys.\par Patient denies changes in vision. Denies palpitations, chest pain, shortness of breath, diaphoresis/hot flashes.

## 2021-02-22 ENCOUNTER — RX RENEWAL (OUTPATIENT)
Age: 81
End: 2021-02-22

## 2021-02-22 RX ORDER — TAMSULOSIN HYDROCHLORIDE 0.4 MG/1
0.4 CAPSULE ORAL
Qty: 180 | Refills: 0 | Status: ACTIVE | COMMUNITY
Start: 2019-09-23 | End: 1900-01-01

## 2021-03-01 RX ORDER — CLOPIDOGREL BISULFATE 75 MG/1
75 TABLET, FILM COATED ORAL DAILY
Qty: 90 | Refills: 3 | Status: ACTIVE | COMMUNITY
Start: 2019-09-23 | End: 1900-01-01

## 2021-03-10 RX ORDER — METOPROLOL SUCCINATE 50 MG/1
50 TABLET, EXTENDED RELEASE ORAL DAILY
Qty: 90 | Refills: 3 | Status: ACTIVE | COMMUNITY
Start: 2019-09-23 | End: 1900-01-01

## 2021-04-09 RX ORDER — SIMVASTATIN 40 MG/1
40 TABLET, FILM COATED ORAL
Qty: 90 | Refills: 1 | Status: ACTIVE | COMMUNITY
Start: 2019-09-23 | End: 1900-01-01

## 2021-04-09 RX ORDER — FENOFIBRATE 145 MG/1
145 TABLET, COATED ORAL DAILY
Qty: 90 | Refills: 1 | Status: ACTIVE | COMMUNITY
Start: 2019-09-23 | End: 1900-01-01

## 2021-06-18 RX ORDER — GLIMEPIRIDE 2 MG/1
2 TABLET ORAL
Qty: 180 | Refills: 0 | Status: ACTIVE | COMMUNITY
Start: 2019-09-23 | End: 1900-01-01

## 2021-06-29 ENCOUNTER — APPOINTMENT (OUTPATIENT)
Dept: GERIATRICS | Facility: CLINIC | Age: 81
End: 2021-06-29
Payer: MEDICARE

## 2021-06-29 VITALS
HEART RATE: 71 BPM | RESPIRATION RATE: 14 BRPM | HEIGHT: 64 IN | SYSTOLIC BLOOD PRESSURE: 110 MMHG | WEIGHT: 174.13 LBS | DIASTOLIC BLOOD PRESSURE: 70 MMHG | BODY MASS INDEX: 29.73 KG/M2 | OXYGEN SATURATION: 96 %

## 2021-06-29 DIAGNOSIS — E11.40 TYPE 2 DIABETES MELLITUS WITH DIABETIC NEUROPATHY, UNSPECIFIED: ICD-10-CM

## 2021-06-29 DIAGNOSIS — N18.30 CHRONIC KIDNEY DISEASE, STAGE 3 UNSPECIFIED: ICD-10-CM

## 2021-06-29 DIAGNOSIS — E11.9 TYPE 2 DIABETES MELLITUS W/OUT COMPLICATIONS: ICD-10-CM

## 2021-06-29 DIAGNOSIS — I10 ESSENTIAL (PRIMARY) HYPERTENSION: ICD-10-CM

## 2021-06-29 PROCEDURE — 99214 OFFICE O/P EST MOD 30 MIN: CPT

## 2021-06-29 NOTE — PHYSICAL EXAM
[General Appearance - Alert] : alert [General Appearance - In No Acute Distress] : in no acute distress [General Appearance - Well Developed] : well developed [General Appearance - Well-Appearing] : healthy appearing [Outer Ear] : the ears and nose were normal in appearance [Neck Appearance] : the appearance of the neck was normal [Neck Cervical Mass (___cm)] : no neck mass was observed [Jugular Venous Distention Increased] : there was no jugular-venous distention [Thyroid Diffuse Enlargement] : the thyroid was not enlarged [Thyroid Nodule] : there were no palpable thyroid nodules [Respiration, Rhythm And Depth] : normal respiratory rhythm and effort [Exaggerated Use Of Accessory Muscles For Inspiration] : no accessory muscle use [Auscultation Breath Sounds / Voice Sounds] : lungs were clear to auscultation bilaterally [Heart Rate And Rhythm] : heart rate was normal and rhythm regular [Heart Sounds] : normal S1 and S2 [FreeTextEntry1] : Precordial 2/6 systolic ejection murmur [Full Pulse] : the pedal pulses are present [Edema] : there was no peripheral edema [Bowel Sounds] : normal bowel sounds [Abdomen Soft] : soft [Abdomen Tenderness] : non-tender [Abdomen Mass (___ Cm)] : no abdominal mass palpated [Cervical Lymph Nodes Enlarged Posterior Bilaterally] : posterior cervical [Cervical Lymph Nodes Enlarged Anterior Bilaterally] : anterior cervical [Supraclavicular Lymph Nodes Enlarged Bilaterally] : supraclavicular [Axillary Lymph Nodes Enlarged Bilaterally] : axillary [No CVA Tenderness] : no ~M costovertebral angle tenderness [No Spinal Tenderness] : no spinal tenderness [Nail Clubbing] : no clubbing  or cyanosis of the fingernails [Involuntary Movements] : no involuntary movements were seen [Motor Tone] : muscle strength and tone were normal [] : no rash [No Focal Deficits] : no focal deficits [Oriented To Time, Place, And Person] : oriented to person, place, and time

## 2021-06-29 NOTE — HISTORY OF PRESENT ILLNESS
[FreeTextEntry1] : Mr. Molina is an 81-year-old functionally independent gentleman presenting for followup. He states that he has been seeing a local physician for some time now and recently had labs. He has a followup with Dr. Hillman tomorrow to discuss elevated creatinine. The patient feels otherwise well. Medications were reconciled. [No falls in past year] : Patient reported no falls in the past year [Fully functional (bathing, dressing, toileting, transferring, walking, feeding)] : Fully functional (bathing, dressing, toileting, transferring, walking, feeding) [Fully functional (using the telephone, shopping, preparing meals, housekeeping, doing laundry, using transportation,] : Fully functional and needs no help or supervision to perform IADLs (using the telephone, shopping, preparing meals, housekeeping, doing laundry, using transportation, managing medications and managing finances) [facility] : 8 [0] : 2) Feeling down, depressed, or hopeless: Not at all [PHQ-2 Score ___] : PHQ-2 Score [unfilled]

## 2021-06-29 NOTE — ASSESSMENT
[FreeTextEntry1] : Mr. Molina will be following with his local PMD going forward. He has an appointment tomorrow to discuss results of recent lab work

## 2021-07-06 ENCOUNTER — NON-APPOINTMENT (OUTPATIENT)
Age: 81
End: 2021-07-06

## 2022-01-24 NOTE — CONSULT NOTE ADULT - I HAVE PERSONALLY SEEN, EXAMINED, AND PARTICIPATED IN THE CARE OF THIS PATIENT.  I HAVE REVIEWED ALL PERTINENT CLINICAL INFORMATION, INCLUDING HISTORY, PHYSICAL EXAM, PLAN AND THE MEDICAL/PA/NP STUDENT’S NOTE AND AGREE EXCEPT AS NOTED.
The polyp(s) showed hyperplastic change, which is non-cancerous and not pre-cancerous. Follow-up colonoscopy in 10 years is advised.    Statement Selected

## 2024-09-19 ENCOUNTER — APPOINTMENT (OUTPATIENT)
Dept: ORTHOPEDIC SURGERY | Facility: CLINIC | Age: 84
End: 2024-09-19
Payer: MEDICARE

## 2024-09-19 DIAGNOSIS — G56.22 LESION OF ULNAR NERVE, LEFT UPPER LIMB: ICD-10-CM

## 2024-09-19 DIAGNOSIS — M65.332 TRIGGER FINGER, LEFT MIDDLE FINGER: ICD-10-CM

## 2024-09-19 DIAGNOSIS — M65.342 TRIGGER FINGER, LEFT RING FINGER: ICD-10-CM

## 2024-09-19 PROCEDURE — 73130 X-RAY EXAM OF HAND: CPT | Mod: LT

## 2024-09-19 PROCEDURE — 99203 OFFICE O/P NEW LOW 30 MIN: CPT

## 2024-09-19 PROCEDURE — 73080 X-RAY EXAM OF ELBOW: CPT | Mod: LT

## 2024-09-19 NOTE — IMAGING
[de-identified] : left dorsal hand with healed wound s/p melanoma excision pt with + tinel over the cubital and tunnel of guyon mild ttp over A1 pulley middle and ring fingers with mild triggering of the ring finger.  there is no atrophy noted left elbow / hand ROM with full and pain free ROM. bilateral wrists with mildly limited extension. diminished sensation over the ulnar nerve distribution.  Spurling Signs are negative.

## 2024-09-19 NOTE — ASSESSMENT
[FreeTextEntry1] : The patient was advised of the diagnosis. The natural history of the pathology was explained in full to the patient in layman's terms. All questions were answered. The risks and benefits of surgical and non-surgical treatment alternatives were explained in full to the patient.  Pt declines CSI to left ring and middle trigger fingers due to uncontrolled DM2. recommend cubital tunnel night brace x 3 weeks. RTO in 3 weeks for f/u care.   Possibility of superficial nerve damage s/p surgery discussed, however family informed that there is no surgical repair for this.

## 2024-09-19 NOTE — HISTORY OF PRESENT ILLNESS
[de-identified] : 9/19/2024: LHD 83 yo male here s/p left dorsal wrist melanoma one month ago. After stiches were removed, pt has noted left hand burning/pain. Mr. Molina has also left middle finger pain / tightness that is worse at night.  PMH: DM2, BPH, GERD, HTN. (Last A1c was 9) Allergies: NKDA

## 2024-10-14 ENCOUNTER — OUTPATIENT (OUTPATIENT)
Dept: OUTPATIENT SERVICES | Facility: HOSPITAL | Age: 84
LOS: 1 days | End: 2024-10-14
Payer: MEDICARE

## 2024-10-14 VITALS
TEMPERATURE: 98 F | RESPIRATION RATE: 18 BRPM | HEART RATE: 70 BPM | HEIGHT: 64 IN | DIASTOLIC BLOOD PRESSURE: 72 MMHG | WEIGHT: 151.9 LBS | SYSTOLIC BLOOD PRESSURE: 119 MMHG | OXYGEN SATURATION: 98 %

## 2024-10-14 DIAGNOSIS — Z98.890 OTHER SPECIFIED POSTPROCEDURAL STATES: Chronic | ICD-10-CM

## 2024-10-14 DIAGNOSIS — Z01.818 ENCOUNTER FOR OTHER PREPROCEDURAL EXAMINATION: ICD-10-CM

## 2024-10-14 DIAGNOSIS — Z87.898 PERSONAL HISTORY OF OTHER SPECIFIED CONDITIONS: ICD-10-CM

## 2024-10-14 DIAGNOSIS — E11.9 TYPE 2 DIABETES MELLITUS WITHOUT COMPLICATIONS: ICD-10-CM

## 2024-10-14 DIAGNOSIS — R39.15 URGENCY OF URINATION: ICD-10-CM

## 2024-10-14 DIAGNOSIS — Z95.0 PRESENCE OF CARDIAC PACEMAKER: Chronic | ICD-10-CM

## 2024-10-14 DIAGNOSIS — Z92.89 PERSONAL HISTORY OF OTHER MEDICAL TREATMENT: Chronic | ICD-10-CM

## 2024-10-14 DIAGNOSIS — Z98.49 CATARACT EXTRACTION STATUS, UNSPECIFIED EYE: Chronic | ICD-10-CM

## 2024-10-14 DIAGNOSIS — D49.4 NEOPLASM OF UNSPECIFIED BEHAVIOR OF BLADDER: ICD-10-CM

## 2024-10-14 DIAGNOSIS — Z95.0 PRESENCE OF CARDIAC PACEMAKER: ICD-10-CM

## 2024-10-14 PROBLEM — E78.00 PURE HYPERCHOLESTEROLEMIA, UNSPECIFIED: Chronic | Status: INACTIVE | Noted: 2018-06-20 | Resolved: 2024-10-14

## 2024-10-14 LAB
A1C WITH ESTIMATED AVERAGE GLUCOSE RESULT: 9.1 % — HIGH (ref 4–5.6)
ANION GAP SERPL CALC-SCNC: 12 MMOL/L — SIGNIFICANT CHANGE UP (ref 5–17)
BLD GP AB SCN SERPL QL: NEGATIVE — SIGNIFICANT CHANGE UP
BUN SERPL-MCNC: 22 MG/DL — SIGNIFICANT CHANGE UP (ref 7–23)
CALCIUM SERPL-MCNC: 9.8 MG/DL — SIGNIFICANT CHANGE UP (ref 8.4–10.5)
CHLORIDE SERPL-SCNC: 101 MMOL/L — SIGNIFICANT CHANGE UP (ref 96–108)
CO2 SERPL-SCNC: 28 MMOL/L — SIGNIFICANT CHANGE UP (ref 22–31)
CREAT SERPL-MCNC: 1.16 MG/DL — SIGNIFICANT CHANGE UP (ref 0.5–1.3)
EGFR: 61 ML/MIN/1.73M2 — SIGNIFICANT CHANGE UP
ESTIMATED AVERAGE GLUCOSE: 214 MG/DL — HIGH (ref 68–114)
GLUCOSE SERPL-MCNC: 267 MG/DL — HIGH (ref 70–99)
HCT VFR BLD CALC: 45.2 % — SIGNIFICANT CHANGE UP (ref 39–50)
HCV AB S/CO SERPL IA: 0.05 S/CO — SIGNIFICANT CHANGE UP
HCV AB SERPL-IMP: SIGNIFICANT CHANGE UP
HGB BLD-MCNC: 14.4 G/DL — SIGNIFICANT CHANGE UP (ref 13–17)
MCHC RBC-ENTMCNC: 27.9 PG — SIGNIFICANT CHANGE UP (ref 27–34)
MCHC RBC-ENTMCNC: 31.9 GM/DL — LOW (ref 32–36)
MCV RBC AUTO: 87.6 FL — SIGNIFICANT CHANGE UP (ref 80–100)
NRBC # BLD: 0 /100 WBCS — SIGNIFICANT CHANGE UP (ref 0–0)
PLATELET # BLD AUTO: 183 K/UL — SIGNIFICANT CHANGE UP (ref 150–400)
POTASSIUM SERPL-MCNC: 4.2 MMOL/L — SIGNIFICANT CHANGE UP (ref 3.5–5.3)
POTASSIUM SERPL-SCNC: 4.2 MMOL/L — SIGNIFICANT CHANGE UP (ref 3.5–5.3)
RBC # BLD: 5.16 M/UL — SIGNIFICANT CHANGE UP (ref 4.2–5.8)
RBC # FLD: 14.3 % — SIGNIFICANT CHANGE UP (ref 10.3–14.5)
RH IG SCN BLD-IMP: POSITIVE — SIGNIFICANT CHANGE UP
SODIUM SERPL-SCNC: 141 MMOL/L — SIGNIFICANT CHANGE UP (ref 135–145)
WBC # BLD: 6.68 K/UL — SIGNIFICANT CHANGE UP (ref 3.8–10.5)
WBC # FLD AUTO: 6.68 K/UL — SIGNIFICANT CHANGE UP (ref 3.8–10.5)

## 2024-10-14 RX ORDER — ANTACID TABLETS 500 MG/1
0 TABLET, CHEWABLE ORAL
Refills: 0 | DISCHARGE

## 2024-10-14 RX ORDER — FINASTERIDE 5 MG/1
1 TABLET, FILM COATED ORAL
Refills: 0 | DISCHARGE

## 2024-10-14 RX ORDER — APIXABAN 5 MG/1
1 TABLET, FILM COATED ORAL
Refills: 0 | DISCHARGE

## 2024-10-14 RX ORDER — ATORVASTATIN CALCIUM 10 MG/1
1 TABLET, FILM COATED ORAL
Refills: 0 | DISCHARGE

## 2024-10-14 RX ORDER — FENOFIBRATE NANOCRYSTALLIZED 145 MG
1 TABLET ORAL
Refills: 0 | DISCHARGE

## 2024-10-14 RX ORDER — AMPICILLIN TRIHYDRATE 250 MG
50 CAPSULE ORAL
Refills: 0 | DISCHARGE

## 2024-10-14 RX ORDER — DONEPEZIL HCL 10 MG
0 TABLET ORAL
Refills: 0 | DISCHARGE

## 2024-10-14 RX ORDER — CARBIDOPA/LEVODOPA 25MG-100MG
1 TABLET ORAL
Refills: 0 | DISCHARGE

## 2024-10-14 RX ORDER — METFORMIN HCL 500 MG
1 TABLET ORAL
Refills: 0 | DISCHARGE

## 2024-10-14 RX ORDER — METOPROLOL TARTRATE 50 MG
1 TABLET ORAL
Refills: 0 | DISCHARGE

## 2024-10-14 RX ORDER — PIOGLITAZONE HYDROCHLORIDE 15 MG/1
1 TABLET ORAL
Refills: 0 | DISCHARGE

## 2024-10-14 NOTE — H&P PST ADULT - NSICDXPASTMEDICALHX_GEN_ALL_CORE_FT
PAST MEDICAL HISTORY:  Cardiac pacemaker     Dyslipidemia     H/O hearing loss     History of glaucoma     History of memory loss     History of osteoarthritis     Hypertension     Neuropathy     T2DM (type 2 diabetes mellitus)      PAST MEDICAL HISTORY:  Cardiac pacemaker     Cervical osteophyte     Dyslipidemia     Essential tremor     H/O hearing loss     History of glaucoma     History of memory loss     History of osteoarthritis     History of urinary urgency     Hypertension     Neuropathy     T2DM (type 2 diabetes mellitus)      PAST MEDICAL HISTORY:  Cardiac pacemaker     Cervical osteophyte     Chronic kidney disease (CKD)     Dyslipidemia     Essential tremor     H/O hearing loss     History of glaucoma     History of memory loss     History of osteoarthritis     History of urinary urgency     Hypertension     Neuropathy     T2DM (type 2 diabetes mellitus)

## 2024-10-14 NOTE — H&P PST ADULT - HISTORY OF PRESENT ILLNESS
84 year old male with h/o sick sinus syndrome s/p pacemaker placement/ [employer] on 10/15/2021, paroxysmal Afib s/p AV node ablation on 9/2023 (on Eliquis), hypertension, dyslipidemia, T2SDM, glaucoma, impaired memory, essential tremors, hearing loss, osteoarthritis, peripheral neuropathy, presents to [facility] accompanied by his brother, for scheduled saline bipolar transurethral resection of prostate with thunderbeat/ bladder biopsy on 10/22/2024. Pt denies any SOB, no CP, no hematuria, no dysuria.    ****On Eliquis, was advised by his cardiologist to hold medication for 2 days prior to surgery, last dose on 10/19/2024. 84 year old male with h/o sick sinus syndrome s/p pacemaker placement/ [employer] on 10/15/2021, paroxysmal Afib s/p AV node ablation on 9/2023 (on Eliquis), hypertension, dyslipidemia, CKD, T2DM, glaucoma, impaired memory, essential tremors, hearing loss, osteoarthritis, peripheral neuropathy, presents to [facility] accompanied by his brother, for scheduled saline bipolar transurethral resection of prostate with thunderbeat/ bladder biopsy on 10/22/2024. Pt denies any SOB, no CP, no hematuria, no dysuria.    ****On Eliquis, was advised by his cardiologist to hold medication for 2 days prior to surgery, last dose on 10/19/2024.

## 2024-10-14 NOTE — H&P PST ADULT - NSICDXPASTSURGICALHX_GEN_ALL_CORE_FT
PAST SURGICAL HISTORY:  H/O cardiac pacemaker     H/O cataract extraction     History of colonoscopy     History of dental surgery     History of prostate surgery     S/P right rotator cuff repair      PAST SURGICAL HISTORY:  H/O cardiac pacemaker     H/O cataract extraction     H/O cervical spine surgery     History of colonoscopy     History of dental surgery     History of herniorrhaphy     History of prostate surgery     S/P right rotator cuff repair

## 2024-10-14 NOTE — H&P PST ADULT - PROBLEM SELECTOR PLAN 1
Scheduled for saline bipolar transurethral resection of prostate with thunderbeat / and bladder biopsy  preop instruction provided and both pt and his brother verbalized understanding  labs result pending

## 2024-10-14 NOTE — H&P PST ADULT - NSICDXPROCEDURE_GEN_ALL_CORE_FT
PROCEDURES:  Transurethral prostatectomy 14-Oct-2024 11:09:39 Neoplasm of unspecified behavior of bladder/ urgency of urination Johanna Lomeli

## 2024-10-14 NOTE — H&P PST ADULT - ASSESSMENT
Patient with top and bottom implant  goes to the [facility], stretching physical therapy twice a week  Dasi activities: goes to the [facility], stretching daily, physical therapy twice a week   Dasi score: 5.62  Patient with top and bottom implant      CAPRINI SCORE    AGE RELATED RISK FACTORS                                                             [ ] Age 41-60 years                                            (1 Point)  [ ] Age: 61-74 years                                           (2 Points)                 [3 ] Age= 75 years                                                (3 Points)             DISEASE RELATED RISK FACTORS                                                       [ ] Edema in the lower extremities                 (1 Point)                     [ ] Varicose veins                                               (1 Point)                                 [1 ] BMI > 25 Kg/m2                                            (1 Point)                                  [ ] Serious infection (ie PNA)                            (1 Point)                     [ ] Lung disease ( COPD, Emphysema)            (1 Point)                                                                          [ ] Acute myocardial infarction                         (1 Point)                  [ ] Congestive heart failure (in the previous month)  (1 Point)         [ ] Inflammatory bowel disease                            (1 Point)                  [ ] Central venous access, PICC or Port               (2 points)       (within the last month)                                                                [ ] Stroke (in the previous month)                        (5 Points)    [ ] Previous or present malignancy                       (2 points)                                                                                                                                                         HEMATOLOGY RELATED FACTORS                                                         [ ] Prior episodes of VTE                                     (3 Points)                     [ ] Positive family history for VTE                      (3 Points)                  [ ] Prothrombin 17790 A                                     (3 Points)                     [ ] Factor V Leiden                                                (3 Points)                        [ ] Lupus anticoagulants                                      (3 Points)                                                           [ ] Anticardiolipin antibodies                              (3 Points)                                                       [ ] High homocysteine in the blood                   (3 Points)                                             [ ] Other congenital or acquired thrombophilia      (3 Points)                                                [ ] Heparin induced thrombocytopenia                  (3 Points)                                        MOBILITY RELATED FACTORS  [ ] Bed rest                                                         (1 Point)  [ ] Plaster cast                                                    (2 points)  [ ] Bed bound for more than 72 hours           (2 Points)    GENDER SPECIFIC FACTORS  [ ] Pregnancy or had a baby within the last month   (1 Point)  [ ] Post-partum < 6 weeks                                   (1 Point)  [ ] Hormonal therapy  or oral contraception   (1 Point)  [ ] History of pregnancy complications              (1 point)  [ ] Unexplained or recurrent              (1 Point)    OTHER RISK FACTORS                                           (1 Point)  [ ] BMI >40, smoking, diabetes requiring insulin, chemotherapy  blood transfusions and length of surgery over 2 hours    SURGERY RELATED RISK FACTORS  [ ]  Section within the last month     (1 Point)  [ ] Minor surgery                                                  (1 Point)  [ ] Arthroscopic surgery                                       (2 Points)  [2 ] Planned major surgery lasting more            (2 Points)      than 45 minutes     [ ] Elective hip or knee joint replacement       (5 points)       surgery                                                TRAUMA RELATED RISK FACTORS  [ ] Fracture of the hip, pelvis, or leg                       (5 Points)  [ ] Spinal cord injury resulting in paralysis             (5 points)       (in the previous month)    [ ] Paralysis  (less than 1 month)                             (5 Points)  [ ] Multiple Trauma within 1 month                        (5 Points)    Total Score[6 ]    Caprini Score 0-2: Low Risk, NO VTE prophylaxis required for most patients, encourage ambulation  Caprini Score 3-6: Moderate Risk , pharmacologic VTE prophylaxis is indicated for most patients (in the absence of contraindications)  Caprini Score Greater than or =7: High risk, pharmocologic VTE prophylaxis indicated for most patients (in the absence of contraindications)

## 2024-10-14 NOTE — H&P PST ADULT - PROBLEM SELECTOR PLAN 2
patient instructed not to take any diabetes medication on DOS  noted on electrophysiologist note, pt is on Januvia, but the latest not on his list of medication

## 2024-10-15 LAB
CULTURE RESULTS: SIGNIFICANT CHANGE UP
SPECIMEN SOURCE: SIGNIFICANT CHANGE UP

## 2024-11-20 PROCEDURE — 85027 COMPLETE CBC AUTOMATED: CPT

## 2024-11-20 PROCEDURE — 86901 BLOOD TYPING SEROLOGIC RH(D): CPT

## 2024-11-20 PROCEDURE — 87086 URINE CULTURE/COLONY COUNT: CPT

## 2024-11-20 PROCEDURE — 86803 HEPATITIS C AB TEST: CPT

## 2024-11-20 PROCEDURE — 36415 COLL VENOUS BLD VENIPUNCTURE: CPT

## 2024-11-20 PROCEDURE — 86900 BLOOD TYPING SEROLOGIC ABO: CPT

## 2024-11-20 PROCEDURE — 80048 BASIC METABOLIC PNL TOTAL CA: CPT

## 2024-11-20 PROCEDURE — G0463: CPT

## 2024-11-20 PROCEDURE — 86850 RBC ANTIBODY SCREEN: CPT

## 2024-11-20 PROCEDURE — 83036 HEMOGLOBIN GLYCOSYLATED A1C: CPT

## 2025-08-26 ENCOUNTER — APPOINTMENT (OUTPATIENT)
Dept: ORTHOPEDIC SURGERY | Facility: CLINIC | Age: 85
End: 2025-08-26
Payer: MEDICARE

## 2025-08-26 DIAGNOSIS — M53.9 DORSOPATHY, UNSPECIFIED: ICD-10-CM

## 2025-08-26 DIAGNOSIS — M62.830 MUSCLE SPASM OF BACK: ICD-10-CM

## 2025-08-26 PROCEDURE — 99204 OFFICE O/P NEW MOD 45 MIN: CPT

## 2025-08-26 RX ORDER — ALFUZOSIN HYDROCHLORIDE 10 MG/1
10 TABLET, EXTENDED RELEASE ORAL
Refills: 0 | Status: ACTIVE | COMMUNITY

## 2025-08-26 RX ORDER — CYCLOBENZAPRINE HYDROCHLORIDE 5 MG/1
5 TABLET, FILM COATED ORAL
Qty: 60 | Refills: 0 | Status: ACTIVE | COMMUNITY
Start: 2025-08-26 | End: 1900-01-01

## 2025-09-17 ENCOUNTER — RESULT REVIEW (OUTPATIENT)
Age: 85
End: 2025-09-17